# Patient Record
Sex: FEMALE | URBAN - METROPOLITAN AREA
[De-identification: names, ages, dates, MRNs, and addresses within clinical notes are randomized per-mention and may not be internally consistent; named-entity substitution may affect disease eponyms.]

---

## 2022-07-28 ENCOUNTER — OFFICE VISIT (OUTPATIENT)
Dept: URBAN - METROPOLITAN AREA CLINIC 51 | Facility: CLINIC | Age: 32
End: 2022-07-28
Payer: COMMERCIAL

## 2022-07-28 DIAGNOSIS — H57.13 OCULAR PAIN, BILATERAL: Primary | ICD-10-CM

## 2022-07-28 PROCEDURE — 99204 OFFICE O/P NEW MOD 45 MIN: CPT | Performed by: OPTOMETRIST

## 2022-07-28 ASSESSMENT — VISUAL ACUITY
OD: 20/20
OS: 20/20

## 2022-07-28 ASSESSMENT — INTRAOCULAR PRESSURE
OS: 20
OD: 19

## 2022-07-28 NOTE — IMPRESSION/PLAN
Impression: Ocular pain, bilateral: H57.13.
*Denies any seasonal allergies *Occurred in mid-June 2022. *Pt tested positive for COVID 2 weeks after the onset of the pain and is wondering if it's correlated. *Denies that vision was affected, itching, irritation, diplopia, photophobia *Denies new floaters that have mostly gone away. Denies flashes of light. Plan: Patient education that although this presents as an ocular symptom, this is not related to glaucoma but may have some association with COIVD-19.  If this occurs again, pt to RTC to evaluate

## 2025-02-28 ENCOUNTER — OFFICE VISIT (OUTPATIENT)
Dept: URBAN - METROPOLITAN AREA CLINIC 51 | Facility: CLINIC | Age: 35
End: 2025-02-28
Payer: COMMERCIAL

## 2025-02-28 DIAGNOSIS — H10.9 CONJUNCTIVITIS: Primary | ICD-10-CM

## 2025-02-28 PROCEDURE — 92014 COMPRE OPH EXAM EST PT 1/>: CPT | Performed by: OPTOMETRIST

## 2025-02-28 ASSESSMENT — INTRAOCULAR PRESSURE
OD: 20
OS: 18